# Patient Record
Sex: MALE | Race: WHITE | Employment: FULL TIME | URBAN - METROPOLITAN AREA
[De-identification: names, ages, dates, MRNs, and addresses within clinical notes are randomized per-mention and may not be internally consistent; named-entity substitution may affect disease eponyms.]

---

## 2022-08-10 ENCOUNTER — APPOINTMENT (OUTPATIENT)
Dept: GENERAL RADIOLOGY | Age: 42
End: 2022-08-10
Payer: COMMERCIAL

## 2022-08-10 ENCOUNTER — HOSPITAL ENCOUNTER (EMERGENCY)
Age: 42
Discharge: HOME | End: 2022-08-10
Attending: EMERGENCY MEDICINE
Payer: COMMERCIAL

## 2022-08-10 VITALS
HEART RATE: 73 BPM | HEIGHT: 76 IN | BODY MASS INDEX: 28.01 KG/M2 | WEIGHT: 230 LBS | OXYGEN SATURATION: 100 % | TEMPERATURE: 98.1 F | SYSTOLIC BLOOD PRESSURE: 142 MMHG | DIASTOLIC BLOOD PRESSURE: 95 MMHG | RESPIRATION RATE: 18 BRPM

## 2022-08-10 DIAGNOSIS — S93.401A SPRAIN OF RIGHT ANKLE, UNSPECIFIED LIGAMENT, INITIAL ENCOUNTER: ICD-10-CM

## 2022-08-10 DIAGNOSIS — S93.601A SPRAIN OF RIGHT FOOT, INITIAL ENCOUNTER: Primary | ICD-10-CM

## 2022-08-10 PROCEDURE — 99283 EMERGENCY DEPT VISIT LOW MDM: CPT

## 2022-08-10 PROCEDURE — 73630 X-RAY EXAM OF FOOT: CPT

## 2022-08-10 PROCEDURE — 73610 X-RAY EXAM OF ANKLE: CPT

## 2022-08-10 RX ORDER — KETOROLAC TROMETHAMINE 10 MG/1
10 TABLET, FILM COATED ORAL EVERY 6 HOURS PRN
Qty: 20 TABLET | Refills: 0 | Status: SHIPPED | OUTPATIENT
Start: 2022-08-10

## 2022-08-10 RX ORDER — KETOROLAC TROMETHAMINE 10 MG/1
10 TABLET, FILM COATED ORAL EVERY 6 HOURS PRN
Qty: 10 TABLET | Refills: 0 | Status: SHIPPED | OUTPATIENT
Start: 2022-08-10 | End: 2022-08-10

## 2022-08-10 ASSESSMENT — PAIN DESCRIPTION - ORIENTATION
ORIENTATION: RIGHT
ORIENTATION: RIGHT

## 2022-08-10 ASSESSMENT — PAIN DESCRIPTION - LOCATION
LOCATION: FOOT
LOCATION: FOOT

## 2022-08-10 ASSESSMENT — PAIN DESCRIPTION - FREQUENCY: FREQUENCY: INTERMITTENT

## 2022-08-10 ASSESSMENT — PAIN DESCRIPTION - PAIN TYPE: TYPE: ACUTE PAIN

## 2022-08-10 ASSESSMENT — ENCOUNTER SYMPTOMS: BACK PAIN: 0

## 2022-08-10 ASSESSMENT — PAIN SCALES - GENERAL
PAINLEVEL_OUTOF10: 6
PAINLEVEL_OUTOF10: 7

## 2022-08-10 ASSESSMENT — PAIN - FUNCTIONAL ASSESSMENT: PAIN_FUNCTIONAL_ASSESSMENT: 0-10

## 2022-08-10 NOTE — ED PROVIDER NOTES
[facility] Provider Note                   PCP:                None Provider               Age: 36 y.o. Sex: male     No diagnosis found. DISPOSITION         MDM     Amount and/or Complexity of Data Reviewed  Tests in the radiology section of CPT®: ordered and reviewed    Risk of Complications, Morbidity, and/or Mortality  Presenting problems: low  Diagnostic procedures: low  Management options: low    Patient Progress  Patient progress: stable       Orders Placed This Encounter   Procedures    XR FOOT RIGHT (MIN 3 VIEWS)    XR ANKLE RIGHT (MIN 3 VIEWS)        Marcelina Montenegro is a 36 y.o. male who presents to the Emergency Department with chief complaint of    Chief Complaint   Patient presents with    Foot Pain     R foot      Patient is a 50-year-old male who presents with right ankle and right foot pain for the last week and a half. Patient states he had no specific trauma but is been swollen painful tender he has had this in the past.  Patient states he has had stress fractures of his foot in the past.    The history is provided by the patient. Foot Problem  Location:  Foot  Injury: no    Foot location:  R foot  Pain details:     Quality:  Aching and dull    Radiates to:  Does not radiate    Severity:  Mild    Onset quality:  Gradual    Duration:  2 weeks    Timing:  Constant    Progression:  Waxing and waning  Chronicity:  Recurrent  Dislocation: no    Tetanus status:  Up to date  Prior injury to area:  Yes  Relieved by:  Nothing  Worsened by:  Nothing  Ineffective treatments:  None tried  Associated symptoms: no back pain, no fever, no itching, no neck pain, no numbness and no stiffness    Risk factors: no concern for non-accidental trauma      All other systems reviewed and are negative. Review of Systems   Constitutional:  Negative for fever. Musculoskeletal:  Positive for joint swelling (Right ankle and right foot tenderness palpation).  Negative for back pain, neck pain and stiffness. Skin:  Negative for itching. All other systems reviewed and are negative. Past Medical History:   Diagnosis Date    Plantar fasciitis         Past Surgical History:   Procedure Laterality Date    HERNIA REPAIR      TONSILLECTOMY          History reviewed. No pertinent family history. Social History     Socioeconomic History    Marital status:      Spouse name: None    Number of children: None    Years of education: None    Highest education level: None   Tobacco Use    Smoking status: Never    Smokeless tobacco: Never   Substance and Sexual Activity    Alcohol use: Yes     Comment: \"a few a day\"    Drug use: Never        Allergies: Patient has no known allergies. Previous Medications    No medications on file        Vitals signs and nursing note reviewed. Patient Vitals for the past 4 hrs:   Temp Pulse Resp BP SpO2   08/10/22 0653 98.1 °F (36.7 °C) 73 18 (!) 142/95 100 %          Physical Exam  Vitals and nursing note reviewed. Constitutional:       Appearance: Normal appearance. HENT:      Head: Normocephalic and atraumatic. Nose: Nose normal.      Mouth/Throat:      Mouth: Mucous membranes are dry. Eyes:      Extraocular Movements: Extraocular movements intact. Conjunctiva/sclera: Conjunctivae normal.      Pupils: Pupils are equal, round, and reactive to light. Cardiovascular:      Rate and Rhythm: Normal rate. Pulses: Normal pulses. Heart sounds: Normal heart sounds. Pulmonary:      Effort: Pulmonary effort is normal.      Breath sounds: Normal breath sounds. Abdominal:      General: Abdomen is flat. Musculoskeletal:         General: Normal range of motion. Cervical back: Normal range of motion and neck supple. Comments: Right ankle mild lateral tenderness palpation and swelling. Foot tenderness palpation dorsal aspect. Neurovascular tact distally. 2+ dorsalis pedis pulse. Skin:     General: Skin is warm.       Capillary Refill: Capillary refill takes less than 2 seconds. Neurological:      General: No focal deficit present. Mental Status: He is alert and oriented to person, place, and time. Psychiatric:         Mood and Affect: Mood normal.         Behavior: Behavior normal.         Thought Content: Thought content normal.         Judgment: Judgment normal.        Procedures      Labs Reviewed - No data to display     XR FOOT RIGHT (MIN 3 VIEWS)    (Results Pending)   XR ANKLE RIGHT (MIN 3 VIEWS)    (Results Pending)                            Voice dictation software was used during the making of this note. This software is not perfect and grammatical and other typographical errors may be present. This note has not been completely proofread for errors.       Milagros Randle MD  08/10/22 [id number]

## 2022-08-10 NOTE — ED NOTES
I have reviewed discharge instructions with the patient. The patient verbalized understanding. Patient left ED via Discharge Method: ambulatory to Home with self. Opportunity for questions and clarification provided. Patient given 2 scripts. To continue your aftercare when you leave the hospital, you may receive an automated call from our care team to check in on how you are doing. This is a free service and part of our promise to provide the best care and service to meet your aftercare needs.  If you have questions, or wish to unsubscribe from this service please call [phone]. Thank you for Choosing our [facility] Emergency Department.         Aguilar Tavares RN  08/10/22 1583

## 2022-08-10 NOTE — ED TRIAGE NOTES
Pt with hx of plantar fascitis, pain to R foot onset Sunday after walking around zoo with child with improper foot wear

## 2022-08-10 NOTE — Clinical Note
Victor Manuel Browne was seen and treated in our emergency department on 8/10/2022. He may return to work on 08/13/2022. If you have any questions or concerns, please don't hesitate to call.       Iman Peters MD